# Patient Record
Sex: FEMALE | Race: WHITE | NOT HISPANIC OR LATINO | URBAN - METROPOLITAN AREA
[De-identification: names, ages, dates, MRNs, and addresses within clinical notes are randomized per-mention and may not be internally consistent; named-entity substitution may affect disease eponyms.]

---

## 2022-10-06 ENCOUNTER — HOSPITAL ENCOUNTER (EMERGENCY)
Facility: CLINIC | Age: 32
Discharge: HOME | End: 2022-10-06
Attending: INTERNAL MEDICINE | Admitting: INTERNAL MEDICINE
Payer: COMMERCIAL

## 2022-10-06 ENCOUNTER — OFFICE VISIT (OUTPATIENT)
Dept: BEHAVIORAL HEALTH | Facility: CLINIC | Age: 32
End: 2022-10-06
Payer: COMMERCIAL

## 2022-10-06 VITALS
TEMPERATURE: 97.6 F | DIASTOLIC BLOOD PRESSURE: 90 MMHG | RESPIRATION RATE: 18 BRPM | OXYGEN SATURATION: 100 % | WEIGHT: 139 LBS | HEART RATE: 98 BPM | SYSTOLIC BLOOD PRESSURE: 147 MMHG

## 2022-10-06 VITALS — DIASTOLIC BLOOD PRESSURE: 86 MMHG | HEART RATE: 113 BPM | WEIGHT: 140.8 LBS | SYSTOLIC BLOOD PRESSURE: 135 MMHG

## 2022-10-06 DIAGNOSIS — Z3A.30 PREGNANCY WITH 30 COMPLETED WEEKS GESTATION: ICD-10-CM

## 2022-10-06 DIAGNOSIS — L02.412 CUTANEOUS ABSCESS OF LEFT AXILLA: ICD-10-CM

## 2022-10-06 DIAGNOSIS — F15.21 METHAMPHETAMINE USE DISORDER, SEVERE, IN EARLY REMISSION (H): ICD-10-CM

## 2022-10-06 DIAGNOSIS — Z32.01 PREGNANCY TEST POSITIVE: ICD-10-CM

## 2022-10-06 DIAGNOSIS — F11.20 OPIOID USE DISORDER, SEVERE, DEPENDENCE (H): Primary | ICD-10-CM

## 2022-10-06 DIAGNOSIS — L02.412 ABSCESS OF AXILLA, LEFT: ICD-10-CM

## 2022-10-06 LAB — FENTANYL UR QL: NORMAL

## 2022-10-06 PROCEDURE — 99204 OFFICE O/P NEW MOD 45 MIN: CPT | Performed by: NURSE PRACTITIONER

## 2022-10-06 PROCEDURE — 99284 EMERGENCY DEPT VISIT MOD MDM: CPT | Mod: 25 | Performed by: INTERNAL MEDICINE

## 2022-10-06 PROCEDURE — 10060 I&D ABSCESS SIMPLE/SINGLE: CPT | Performed by: INTERNAL MEDICINE

## 2022-10-06 PROCEDURE — 250N000013 HC RX MED GY IP 250 OP 250 PS 637: Performed by: INTERNAL MEDICINE

## 2022-10-06 PROCEDURE — 76815 OB US LIMITED FETUS(S): CPT | Performed by: INTERNAL MEDICINE

## 2022-10-06 PROCEDURE — 250N000009 HC RX 250: Performed by: INTERNAL MEDICINE

## 2022-10-06 PROCEDURE — 76815 OB US LIMITED FETUS(S): CPT | Mod: 26 | Performed by: INTERNAL MEDICINE

## 2022-10-06 PROCEDURE — 80307 DRUG TEST PRSMV CHEM ANLYZR: CPT | Performed by: NURSE PRACTITIONER

## 2022-10-06 RX ORDER — LIDOCAINE HYDROCHLORIDE AND EPINEPHRINE 10; 10 MG/ML; UG/ML
20 INJECTION, SOLUTION INFILTRATION; PERINEURAL ONCE
Status: DISCONTINUED | OUTPATIENT
Start: 2022-10-06 | End: 2022-10-06 | Stop reason: HOSPADM

## 2022-10-06 RX ORDER — LIDOCAINE/PRILOCAINE 2.5 %-2.5%
1 CREAM (GRAM) TOPICAL ONCE
Status: COMPLETED | OUTPATIENT
Start: 2022-10-06 | End: 2022-10-06

## 2022-10-06 RX ORDER — HYDROXYZINE HYDROCHLORIDE 25 MG/1
25-50 TABLET, FILM COATED ORAL ONCE
Status: COMPLETED | OUTPATIENT
Start: 2022-10-06 | End: 2022-10-06

## 2022-10-06 RX ORDER — BUPRENORPHINE HYDROCHLORIDE, NALOXONE HYDROCHLORIDE 8; 2 MG/1; MG/1
1 FILM, SOLUBLE BUCCAL; SUBLINGUAL 2 TIMES DAILY
Qty: 20 FILM | Refills: 0 | Status: SHIPPED | OUTPATIENT
Start: 2022-10-06 | End: 2022-10-13

## 2022-10-06 RX ORDER — CLINDAMYCIN HCL 300 MG
300 CAPSULE ORAL 4 TIMES DAILY
Qty: 40 CAPSULE | Refills: 0 | Status: SHIPPED | OUTPATIENT
Start: 2022-10-06 | End: 2022-10-16

## 2022-10-06 RX ORDER — BUPRENORPHINE AND NALOXONE 4; 1 MG/1; MG/1
FILM, SOLUBLE BUCCAL; SUBLINGUAL PRN
COMMUNITY
Start: 2022-06-22 | End: 2022-10-06 | Stop reason: DRUGHIGH

## 2022-10-06 RX ADMIN — HYDROXYZINE HYDROCHLORIDE 50 MG: 25 TABLET, FILM COATED ORAL at 18:39

## 2022-10-06 RX ADMIN — LIDOCAINE AND PRILOCAINE 1 G: 25; 25 CREAM TOPICAL at 18:33

## 2022-10-06 ASSESSMENT — PATIENT HEALTH QUESTIONNAIRE - PHQ9: SUM OF ALL RESPONSES TO PHQ QUESTIONS 1-9: 10

## 2022-10-06 ASSESSMENT — ENCOUNTER SYMPTOMS
WOUND: 1
SHORTNESS OF BREATH: 0
ABDOMINAL PAIN: 0
HEADACHES: 0
ARTHRALGIAS: 0
CONFUSION: 0
COLOR CHANGE: 0
EYE REDNESS: 0
FEVER: 0
NECK STIFFNESS: 0
DIFFICULTY URINATING: 0

## 2022-10-06 ASSESSMENT — ACTIVITIES OF DAILY LIVING (ADL): ADLS_ACUITY_SCORE: 35

## 2022-10-06 NOTE — PROGRESS NOTES
[facility] Initial Visit    ASSESSMENT/PLAN                                                      1. Opioid use disorder, severe, dependence (H)  - pt reporting 1 yr hx of illicit opioid use, currently on Suboxone. Early remission. Last full opioid use 2022. Last Suboxone dose ~ 2 weeks ago.   - Recommended resuming consistent daily dosing. Resume buprenorphine 8 mg SL BID. Ok to decrease to once daily if adverse SE noted.   - Recommended at least 6 months - 1 year of buprenorphine maintenance prior to taper  - Continue programming at [facility]   - Fentanyl Urine, Qualitative; Standing  - SUBOXONE 8-2 MG per film; Place 1 Film under the tongue 2 times daily  Dispense: 20 Film; Refill: 0  - naloxone (NARCAN) 4 MG/0.1ML nasal spray; Spray 1 spray (4 mg) into one nostril alternating nostrils as needed for opioid reversal every 2-3 minutes until assistance arrives  Dispense: 0.2 mL; Refill: 11    2. Methamphetamine use disorder, severe, in early remission (H)  - Stable. Early remission. No recent use or cravings.   - Continue programming at [facility]     3. Abscess of axilla, left  - pt was referred to ED for further evaluation and management. Likely will require I& D and antibiotic tx. Hx of MRSA. Last on antibiotic on 22, took partial course of Clindamycin 300 mg q 8 hr x 14 days. Patient is 30 weeks gestation.   - Report given to ED at 4:10 PM    4. Pregnancy with 30 completed weeks gestation  - OBGYN provider is Dr. Blanca Ramos   - reviewed Suboxone is safe in pregnancy and can be continued while breast feeding post partum.   - continue recommended follow up during pregnancy per Dr. Ramos     Return in about 1 week (around 10/13/2022) for Follow up, with me, in person.    SUBJECTIVE                                                      CC/HPI:  Kalee Cedeno is a 31 year old female,  at 30 weeks, with PMH of methamphetamine use disorder, severe, in early remission, high-risk pregnancy  "in second trimester, diet controlled gestational diabetes mellitus (GDM) in third trimester, Rh negative status, and opioid use disorder who presents to the [facility] for initial visit.       Brief History:  Kalee Cedeno was first seen in Recovery Clinic on 10/06/22. She was referred by [address]  Patient's reasons for seeking treatment on this date include seeking a Suboxone prescription because she is out and currently pregnant.     She started IOP with housing at [facility] today. Previously living at her ex boyfriends house in [place], [place]. Reports she could not find Suboxone rx this morning, her ex boyfriend is using heroin and she suspected he took the prescription. Reports she is currently taking 2-3 films (8-2 mg films) about three times per week, taking suboxone \"as needed\" for withdrawal symptoms or cravings. Last dose of Suboxone was almost 2 weeks ago. Currently feeling anxious, hot/cold flashes, \"I think my blood pressure gets higher\", skin crawling. Last meth and opioid use was 2022. Denies meth cravings. Denies IVDU.     Periods of sustained sobriety during and after pregnancies.  in 2016, had her son in 2017, her now ex  was an alcoholic and they  on their second wedding anniversary. Later her brother passed away and she started methamphetamine daily. Heroin use started later, has been using for about 1 year. Started on Suboxone at the beginning of current pregnancy, found out in 2022.     Reports skin lesions under her left axilla and right axilla. Left is more bothersome. Reports purulent drainage of left lesion this morning. Has had previous incisions and drainage. Last on ABX 22, prescribed clindamycin however only took partial course after lesions cleared. Was told by Dr. Ramos to hold onto the prescription and take if lesion reappear.   She does not have current Rx, [facility] treatment would not allow her to have  prescription. No nausea, " vomiting, fever, chills, no pain. Lesions are tender. No HA. No cough, SOB, CP, heart palpitations.     Abscess of left axilla 04/15/2022 Ab: MILA Mcconnell (Synbiota)       Substance Use History :  Opioids:   Age at first use: 31 yo  Current use: timing of last use: 7/2022; substance: meth, heroin; quantity per pt not that much; route: smoke     IV drug use: No   History of overdose: No  Previous residential or outpatient treatments for addiction : Yes: 2  Previous medication treatments for addiction: Yes: suboxone  Longest period of sobriety: 1 year  Medical complications related to substance use: denies  Hepatitis C: neg; Antibody non reactive on 2/7/2022  HIV: neg; HIV 1/2 Ab/Ag non reactive on 2/7/2022    Taking buprenorphine? No, only taking as needed durng pregnancy As prescribed? No  Number of buprenorphine films/tablets remaining currently: 0    Narcan currently available: No    DSM-5 OUD criteria met:  Taken in larger amounts/greater time spent in behavior over longer period of time than intended,Yes: met for opioids and methamphetamine    Persistent desire or unsuccessful efforts to cut down or control use/behavior, Yes: met for opioids and methamphetamine    A great deal of time is spent in activities necessary to obtain the substance/participate in the behavior or recover from its effects, Yes: met for opioids and methamphetamine    Cravings,Yes: met for opioids  Recurrent use/behavior resulting in failure to fulfill major role obligations at work, school, or home,Yes: met for opioids and methamphetamine    Continued use/behavior despite having persistent or recurrent social or interpersonal problems caused or exacerbated by effects of use/behavior,Yes: met for opioids and methamphetamine    Important social, occupational, or recreational activities are given up or reduced because of use/behavior, Yes: met for opioids and methamphetamine    Recurrent use/behavior in situations in which it is physically  hazardous,Yes: met for opioids and methamphetamine    Continued use/behavior despite knowledge of having a persistent or recurrent physical or psychological problem that is likely to have been caused or exacerbated by use/behavior,Yes: met for opioids and methamphetamine       Other Addiction History:  Stimulants (cocaine, methamphetamine, MDMA, etc)   Past use: yes  Use in last 6 months: yes, was using methamphetamine daily.   Sedatives/hypnotics/anxiolytics: (benzodiazepines -Xanax, Valium, Ativan, Klonopin- GHB, Ambien, phenobarbital, etc)  Past use: denies  Use in last 6 months: no  Alcohol:   Past use: yes  Use in last 6 months: no  Nicotine:   Cigarettes: yes  Vaping: yes, not recently  Chewing tobacco: no  Cannabis:   Past use: yes  Use in last 6 months: yes  Hallucinogens: (LSD/acid, mushrooms, ketamine, etc)  Past use: tried mushrooms  Use in last 6 months: no  Behavioral addictions (eating disorder, gambling, pornography, opal:)   denies     [place] Prescription Drug Monitoring Program Reviewed:  Yes  09/01/2022  2   09/01/2022  Suboxone 8 Mg-2 MG SL Film    42.00  14     Past Medical Hx (retrieved from Epic chart review)     Methamphetamine use disorder, severe, in early remission (HCC)     Stressful life event affecting family     High-risk pregnancy in second trimester     Substance abuse (HCC)     Diet controlled gestational diabetes mellitus (GDM) in third trimester     Rh negative status during pregnancy in first trimester, antepartum     Family history of hepatitis C     PAST PSYCHIATRIC HISTORY:  Diagnoses- manic depression, anxiety, adult ADHD (pt reported)   Suicide Attempts: No   Hospitalizations: No     PHQ 10/6/2022   PHQ-9 Total Score 10   Q9: Thoughts of better off dead/self-harm past 2 weeks Not at all     If PHQ-9 score of 15 or higher, has [facility] therapist or provider been notified? No    Any current suicidal ideation? No  If yes, has [facility] therapist or provider  been notified? N/A    Mental health provider: denies (follow up on MH referral if needed)    No past surgical history on file.    Medications:  No current outpatient medications on file prior to visit.  No current facility-administered medications on file prior to visit.    ALLERGIES  Allergen Reactions     Benzodiazepines Shortness of Breath / Difficulty Breathing     No family history on file.      Social History  Housing status: [facility]  Employment status: Unemployed, not seeking work  Relationship status: Single  Children: 2 children, 10 and 5yr old - not currently under her care. Both children are living with their fathers. Will regain visitation after completing 30 treatment program. No CPS involvement. [place] agreement between her and the fathers.   Legal: denies  Insurance needs: active  Contact information up to date? yes    3rd Party Involvement not today (please obtain SIMRAN if pt would like to include)    REVIEW OF SYSTEMS:  General: No recent fevers.   Eyes:  No vision concerns.  No jaundice.    Resp: No coughing, wheezing or shortness of breath  CV: No chest pains or palpitations  GI: No complaints  : No urinary frequency or dysuria, no discharge  Musculoskeletal: No significant muscle or joint pains other than as above.  No edema  Neurologic: No numbness, tingling, weakness, problems with balance or coordination  Psychiatric: No acute concerns.   Skin: Positive for skin lesion/abscess - see HPI     OBJECTIVE                                                      /86   Pulse 113   Wt 63.9 kg (140 lb 12.8 oz)     Physical Exam  Constitutional:       General: She is not in acute distress.     Appearance: Normal appearance. She is not ill-appearing or diaphoretic.   Eyes:      General: No scleral icterus.     Extraocular Movements: Extraocular movements intact.      Conjunctiva/sclera: Conjunctivae normal.      Pupils: Pupils are equal, round, and reactive to light.   Pulmonary:      Effort:  Pulmonary effort is normal.   Skin:     General: Skin is warm and dry.      Findings: Abscess present.          Neurological:      General: No focal deficit present.      Mental Status: She is alert and oriented to person, place, and time.   Psychiatric:         Attention and Perception: Attention and perception normal.         Mood and Affect: Mood and affect normal. Mood is not anxious or depressed. Affect is not flat.         Speech: Speech normal. Speech is not rapid and pressured or slurred.         Behavior: Behavior is cooperative.         Thought Content: Thought content normal. Thought content does not include suicidal ideation. Thought content does not include suicidal plan.         Cognition and Memory: Cognition and memory normal.         Judgment: Judgment normal.      Comments: Mood and affect are congruent with subject matter        Labs:    UDS: Urine Drug Screen Results  AMP: Negative  BAR: Negative  BUP: Negative  BZO: Negative  FRANCI: Negative  mAMP: Negative  MDMA : Negative  MTD: Negative  [id number]: Negative  OXY: Negative  PCP : Negative  THC : Negative  *POC urine drug screen does not screen for Fentanyl    No results found for this or any previous visit (from the past 720 hour(s)).    Patient counseling completed today:  Discussed mechanism of action, potential risks/benefits/side effects of medications and other recommendations above.    Discussed risk of precipitated withdrawal with initiation of buprenorphine in the presence of full opioid agonists.    Harm reduction counseling including never use alone, availability of naloxone, avoiding combination of opioids with benzodiazepines, alcohol, or other sedatives, safer administration.      Discussed importance of building a network of supportive relationships and psychosocial interventions to treat use disorders; including motivational interviewing regarding psychosocial treatment for addiction.     I sent a total of 50 minutes today on the care  of this patient. This consisted of face-to-face time as well as time spent on pre-visit and post-visit activities including coordination of care, chart review, results review, and documentation.     KECIA Monsalve CNP on 10/6/2022 at 3:31 PM  [facility]  [address], [address]  [place] [address]  [phone]

## 2022-10-06 NOTE — ED TRIAGE NOTES
Pt have wound on both axilla. Pt was diagnosed with MRSA last April. Pt 30wks pregnant      Triage Assessment     Row Name 10/06/22 1708       Triage Assessment (Adult)    Airway WDL WDL       Respiratory WDL    Respiratory WDL WDL    Mucous Membranes cracked       Skin Circulation/Temperature WDL    Skin Circulation/Temperature WDL WDL       Cardiac WDL    Cardiac WDL WDL       Peripheral/Neurovascular WDL    Peripheral Neurovascular WDL WDL       Cognitive/Neuro/Behavioral WDL    Cognitive/Neuro/Behavioral WDL WDL

## 2022-10-06 NOTE — ED PROVIDER NOTES
ED Provider Note  [facility]      History     Chief Complaint   Patient presents with     Wound Check     The history is provided by the patient and medical records.     Kalee Cedeno is a 31 year old  female at 30w3d with history of polysubstance abuse maintained on Suboxone and prior MRSA infection presenting to the ED for evaluation of skin sores and concern for recurrent MRSA infection. Patient reports developing worsening sores over the bilateral axilla over the past week. These are extremely painful. Left side is worse than right, some of the sores on the right drained on their own. This is similar to her prior MRSA infection that was in the same area which required I&D. She was on Clindamycin at that time and was given another prescription at that time to use in the case of recurrent infection, but is currently in treatment and they would not allow her to take this due to the outdated label. She denies IVDU. She was feeling withdrawal symptoms as well today due to not having her Suboxone, but was able to take it just prior to arrival and is feeling better in that regard.     Past Medical History  No past medical history on file.  No past surgical history on file.  clindamycin (CLEOCIN) 300 MG capsule  SUBOXONE 8-2 MG per film  naloxone (NARCAN) 4 MG/0.1ML nasal spray      No Known Allergies  Family History  No family history on file.  Social History   Social History     Substance Use Topics     Alcohol use: Never     Drug use: Not Currently     Types: Methamphetamines     Comment: on treatment      Past medical history, past surgical history, medications, allergies, family history, and social history were reviewed with the patient. No additional pertinent items.       Review of Systems   Constitutional: Negative for fever.   HENT: Negative for congestion.    Eyes: Negative for redness.   Respiratory: Negative for shortness of breath.    Cardiovascular: Negative for chest pain.    Gastrointestinal: Negative for abdominal pain.   Genitourinary: Negative for difficulty urinating.   Musculoskeletal: Negative for arthralgias and neck stiffness.   Skin: Positive for wound (MRSA sores over bilateral axilla). Negative for color change.   Neurological: Negative for headaches.   Psychiatric/Behavioral: Negative for confusion.     A complete review of systems was performed with pertinent positives and negatives noted in the HPI, and all other systems negative.    Physical Exam   BP: (!) 147/90  Pulse: 98  Temp: 97.6  F (36.4  C)  Resp: 18  Weight: 63 kg (139 lb)  SpO2: 100 %  Physical Exam  Vitals and nursing note reviewed.   Constitutional:       General: She is not in acute distress.     Appearance: She is not diaphoretic.   HENT:      Head: Atraumatic.      Mouth/Throat:      Pharynx: No oropharyngeal exudate.   Eyes:      General: No scleral icterus.     Pupils: Pupils are equal, round, and reactive to light.   Cardiovascular:      Rate and Rhythm: Normal rate and regular rhythm.      Heart sounds: Normal heart sounds. No murmur heard.    No friction rub. No gallop.   Pulmonary:      Effort: Pulmonary effort is normal. No respiratory distress.      Breath sounds: Normal breath sounds. No stridor. No wheezing, rhonchi or rales.   Chest:      Chest wall: No tenderness.   Abdominal:      General: Abdomen is flat. Bowel sounds are normal. There is no distension.      Palpations: Abdomen is soft. There is no mass.      Tenderness: There is no abdominal tenderness. There is no right CVA tenderness, left CVA tenderness, guarding or rebound.      Hernia: No hernia is present.   Musculoskeletal:         General: No tenderness.      Cervical back: Neck supple.   Skin:     General: Skin is warm.      Findings: Abscess, erythema and wound present. No abrasion, acne, bruising, burn, ecchymosis, signs of injury, laceration, lesion, petechiae or rash.          Neurological:      General: No focal deficit  present.         ED Course      [facility]    PROCEDURE: -Incision/Drainage    Date/Time: 10/6/2022 10:50 PM  Performed by: Jackelyn Andrade MD  Authorized by: Jackelyn Andrade MD     Risks, benefits and alternatives discussed.      LOCATION:      Type:  Abscess    Size:  Png pong ball size    Location:  Upper extremity    Upper extremity location:  Arm    Arm location:  L upper arm    PRE-PROCEDURE DETAILS:     Skin preparation:  Betadine    PROCEDURE TYPE:     Complexity:  Simple    ANESTHESIA (see MAR for exact dosages):     Anesthesia method:  Local infiltration    Local anesthetic:  Lidocaine 1% WITH epi    PROCEDURE DETAILS:     Needle aspiration: no      Incision types:  Single straight    Incision depth:  Dermal    Scalpel blade:  11    Wound management:  Probed and deloculated    Drainage:  Purulent    Drainage amount:  Moderate    Wound treatment:  Wound left open    Packing materials:  1/4 in iodoform gauze    PROCEDURE    Patient Tolerance:  Patient tolerated the procedure well with no immediate complications    Results for orders placed during the hospital encounter of 10/06/22    POC US OB TRANSABDOMINAL LIMITED    Impression  Limited Bedside Transabdominal ultrasound for evaluation of IUP  Performed any interpreted by me.    Indication:  Left axilla abscess  Findings:  The lower abdomen was interrogated with a curvilinear probe. The uterus was identified.  Within the uterus there is a moving fetus with visible heart rate with FHR of 144    Impression: Intrauterine pregnancy              Results for orders placed or performed during the hospital encounter of 10/06/22   POC US OB TRANSABDOMINAL LIMITED     Status: None    Impression    Limited Bedside Transabdominal ultrasound for evaluation of IUP        Performed any interpreted by me.    Indication:  Left axilla abscess  Findings:  The lower abdomen was interrogated with a curvilinear probe. The uterus was  identified.   Within the uterus there is a moving fetus with visible heart rate with FHR of 144    Impression: Intrauterine pregnancy   Results for orders placed or performed in visit on 10/06/22   Fentanyl Urine, Qualitative     Status: Normal   Result Value Ref Range    Fentanyl Qual Urine Screen Negative Screen Negative     Medications   lidocaine-prilocaine (EMLA) cream 1 g (1 g Topical Given 10/6/22 1833)   hydrOXYzine (ATARAX) tablet 25-50 mg (50 mg Oral Given 10/6/22 1839)        Assessments & Plan (with Medical Decision Making)  Left recurrent abscess of axilla, s/p I and D as above after local with lido and epi, large pus out, pt tolerated well, POCUS with IUP and good FHT, will discharge with clinda and follow up with her PMD OB.       I have reviewed the nursing notes. I have reviewed the findings, diagnosis, plan and need for follow up with the patient.    Discharge Medication List as of 10/6/2022  8:07 PM      START taking these medications    Details   clindamycin (CLEOCIN) 300 MG capsule Take 1 capsule (300 mg) by mouth 4 times daily for 10 days, Disp-40 capsule, R-0, Local Print             Final diagnoses:   Cutaneous abscess of left axilla   Pregnancy test positive   I, Emy Taylor, am serving as a trained medical scribe to document services personally performed by Jackelyn Andrade Md, based on the provider's statements to me.     Jackelyn GODDARD Md, was physically present and have reviewed and verified the accuracy of this note documented by Emy Taylor.      --  Jackelyn Andrade Md  [facility] EMERGENCY DEPARTMENT  10/6/2022     Jackelyn Andrade MD  10/06/22 [id number]

## 2022-10-07 ENCOUNTER — TELEPHONE (OUTPATIENT)
Dept: BEHAVIORAL HEALTH | Facility: CLINIC | Age: 32
End: 2022-10-07

## 2022-10-07 ENCOUNTER — TRANSCRIBE ORDERS (OUTPATIENT)
Dept: BEHAVIORAL HEALTH | Facility: CLINIC | Age: 32
End: 2022-10-07

## 2022-10-13 ENCOUNTER — OFFICE VISIT (OUTPATIENT)
Dept: BEHAVIORAL HEALTH | Facility: CLINIC | Age: 32
End: 2022-10-13
Payer: COMMERCIAL

## 2022-10-13 VITALS — SYSTOLIC BLOOD PRESSURE: 134 MMHG | DIASTOLIC BLOOD PRESSURE: 80 MMHG | HEART RATE: 86 BPM

## 2022-10-13 DIAGNOSIS — F11.20 OPIOID USE DISORDER, SEVERE, DEPENDENCE (H): Primary | ICD-10-CM

## 2022-10-13 DIAGNOSIS — F15.21 METHAMPHETAMINE USE DISORDER, SEVERE, IN EARLY REMISSION (H): ICD-10-CM

## 2022-10-13 DIAGNOSIS — Z3A.31 PREGNANCY WITH 31 COMPLETED WEEKS GESTATION: ICD-10-CM

## 2022-10-13 PROCEDURE — 99214 OFFICE O/P EST MOD 30 MIN: CPT | Performed by: NURSE PRACTITIONER

## 2022-10-13 RX ORDER — BUPRENORPHINE HYDROCHLORIDE, NALOXONE HYDROCHLORIDE 8; 2 MG/1; MG/1
1 FILM, SOLUBLE BUCCAL; SUBLINGUAL 2 TIMES DAILY
Qty: 30 FILM | Refills: 0 | Status: SHIPPED | OUTPATIENT
Start: 2022-10-13 | End: 2022-11-04

## 2022-10-13 ASSESSMENT — PATIENT HEALTH QUESTIONNAIRE - PHQ9: SUM OF ALL RESPONSES TO PHQ QUESTIONS 1-9: 6

## 2022-10-13 NOTE — PROGRESS NOTES
"[facility] - Recovery Clinic Follow Up    ASSESSMENT/PLAN                                                      1. Opioid use disorder, severe, dependence (H)  Controlled.   Continue buprenorphine 16 mg per day, take 8 mg SL BID. Cravings controlled. No recent use. No adverse SE.   Continue inpatient programming at [facility]. Encouraged outpatient program near [place] after graduation and continuation of buprenorphine maintenance.   - Confirms access to narcan.   - SUBOXONE 8-2 MG per film; Place 1 Film under the tongue 2 times daily  Dispense: 30 Film; Refill: 0    2. Methamphetamine use disorder, severe, in early remission (H)  - Controlled. No recent use or cravings. Interventions as above for current and ongoing recovery supports.     3. Pregnancy with 31 completed weeks gestation  - Following with Dr. Rachel JACOBSEN.   - Ultrasound on 10/27/22 per patient      Return in about 2 weeks (around 10/27/2022) for Follow up, with me, in person.    SUBJECTIVE                                                        CC/HPI:  Kalee Cedeno is a 31 year old female,  at 31 weeks, with PMH of methamphetamine use disorder, severe, in early remission, high-risk pregnancy in third trimester, hx of diet controlled gestational diabetes mellitus (GDM) in third trimester, Rh negative status, and opioid use disorder who presents to the Recovery Clinic for follow up.         Brief History:  Kalee Cedeno was first seen in Recovery Clinic on 10/06/22. She was referred by [address]  Patient's reasons for seeking treatment on this date include seeking a Suboxone prescription because she is out and currently pregnant. Previously living at her ex boyfriends house in [place], [place]. Taking 2-3 films (8-2 mg films) about three times per week, \"as needed\" for withdrawal symptoms or cravings. Last dose of Suboxone was almost 2 weeks ago. Last meth and opioid use was 2022. Denies meth cravings. Denies IVDU. Periods of sustained " sobriety during and after pregnancies.  in 2016, had her son in 2017, her now ex  was an alcoholic and they  on their second wedding anniversary. Later her brother passed away and she started methamphetamine daily. Heroin use started later, has been using for about 1 year. Started on Suboxone at the beginning of current pregnancy, found out in April of 2022. Started consistent daily dosing, 16 mg TDD.       Substance Use History :  Opioids:   Age at first use: 29 yo  Current use: timing of last use: 7/2022; substance: meth, heroin; quantity per pt not that much; route: smoke                IV drug use: No   History of overdose: No  Previous residential or outpatient treatments for addiction : Yes: 2  Previous medication treatments for addiction: Yes: suboxone  Longest period of sobriety: 1 year  Medical complications related to substance use: denies  Hepatitis C: neg; Antibody non reactive on 2/7/2022  HIV: neg; HIV 1/2 Ab/Ag non reactive on 2/7/2022      Other Addiction History:  Stimulants (cocaine, methamphetamine, MDMA, etc)   Past use: yes  Use in last 6 months: yes, was using methamphetamine daily.   Sedatives/hypnotics/anxiolytics: (benzodiazepines -Xanax, Valium, Ativan, Klonopin- GHB, Ambien, phenobarbital, etc)  Past use: denies  Use in last 6 months: no  Alcohol:   Past use: yes  Use in last 6 months: no  Nicotine:   Cigarettes: yes  Vaping: yes, not recently  Chewing tobacco: no  Cannabis:   Past use: yes  Use in last 6 months: yes  Hallucinogens: (LSD/acid, mushrooms, ketamine, etc)  Past use: tried mushrooms  Use in last 6 months: no  Behavioral addictions (eating disorder, gambling, pornography, opal:)   denies        Most recent Recovery Clinic visit 10/6/22  A/P from that visit:     1. Opioid use disorder, severe, dependence (H)  - pt reporting 1 yr hx of illicit opioid use, currently on Suboxone. Early remission. Last full opioid use 7/2022. Last Suboxone dose ~ 2 weeks  "ago.   - Recommended resuming consistent daily dosing. Resume buprenorphine 8 mg SL BID. Ok to decrease to once daily if adverse SE noted.   - Recommended at least 6 months - 1 year of buprenorphine maintenance prior to taper  - Continue programming at [facility]   - Fentanyl Urine, Qualitative; Standing  - SUBOXONE 8-2 MG per film; Place 1 Film under the tongue 2 times daily  Dispense: 20 Film; Refill: 0  - naloxone (NARCAN) 4 MG/0.1ML nasal spray; Spray 1 spray (4 mg) into one nostril alternating nostrils as needed for opioid reversal every 2-3 minutes until assistance arrives  Dispense: 0.2 mL; Refill: 11     2. Methamphetamine use disorder, severe, in early remission (H)  - Stable. Early remission. No recent use or cravings.   - Continue programming at [facility]      3. Abscess of axilla, left  - pt was referred to ED for further evaluation and management. Likely will require I& D and antibiotic tx. Hx of MRSA. Last on antibiotic on 8/25/22, took partial course of Clindamycin 300 mg q 8 hr x 14 days. Patient is 30 weeks gestation.   - Report given to ED at 4:10 PM     4. Pregnancy with 30 completed weeks gestation  - OBGYN provider is Dr. Blanca Ramos   - reviewed Suboxone is safe in pregnancy and can be continued while breast feeding post partum.   - continue recommended follow up during pregnancy per Dr. Ramos      Return in about 1 week (around 10/13/2022) for Follow up, with me, in person.    Today, pt states:   - Reports things are going really well at [facility]. \" I really like the staff there\" She feels she has adequate freedom and accountability.   - She has been taking consistent daily dosing, taking 8 mg SL BID. This has improved her intermittent withdrawal symptoms significantly. Denies adverse SE, no constipation, dry mouth, leg swelling, diaphoresis. Cravings are controlled. No recent use.   - No methamphetamine cravings or recent use.   - She got to see her mom and sister last week when they dropped off " "some clothes for her.   - She has an ultrasound appointment on 10/27/22. She also has a telephone follow up visit with Dr. Ramos.   - Has been sleeping well   - She is working on nutrition, and dietary fiber. Drinking plenty of water during the day.   - Mood has been improved, anxiety has improved since her last visit. She is getting to know some of her \"house mates\" Having a birthday party for one of the girls this weekend.   - Left axilla abscess symptoms improved. Did have I&D now on ABX. 10 day course. Warm compressed to express drainage as needed. Less sore today. Plans to follow up after ABX if needed.   - Plans to move back to [place] after treatment at [facility] and complete an outpatient program.   - She is able to talk to her children every night on the phone.   - Without other concerns today     [place] Prescription Drug Monitoring Program Reviewed:  Yes  10/06/2022  4   10/06/2022  Suboxone 8 Mg-2 MG SL Film  20.00  10       Medications:  clindamycin (CLEOCIN) 300 MG capsule, Take 1 capsule (300 mg) by mouth 4 times daily for 10 days  naloxone (NARCAN) 4 MG/0.1ML nasal spray, Spray 1 spray (4 mg) into one nostril alternating nostrils as needed for opioid reversal every 2-3 minutes until assistance arrives    No current facility-administered medications on file prior to visit.      No Known Allergies    PMH, PSH, FamHx reviewed    Past Medical Hx (retrieved from Epic chart review)     Methamphetamine use disorder, severe, in early remission (HCC)     Stressful life event affecting family     High-risk pregnancy in second trimester     Substance abuse (HCC)     Diet controlled gestational diabetes mellitus (GDM) in third trimester     Rh negative status during pregnancy in first trimester, antepartum     Family history of hepatitis C      PAST PSYCHIATRIC HISTORY:  Diagnoses- manic depression, anxiety, adult ADHD (pt reported)   Suicide Attempts: No   Hospitalizations: No     Social History  Housing " status: [facility]  Employment status: Unemployed, not seeking work  Relationship status: Single  Children: 2 children, 10 and 5yr old - not currently under her care. Both children are living with their fathers. Will regain visitation after completing 30 treatment program. No CPS involvement. [place] agreement between her and the fathers.   Legal: denies  Insurance needs: active  Contact information up to date? yes    OBJECTIVE                                                      /80   Pulse 86     Physical Exam  Constitutional:       General: She is not in acute distress.     Appearance: Normal appearance.   Eyes:      Extraocular Movements: Extraocular movements intact.   Cardiovascular:      Rate and Rhythm: Normal rate.   Pulmonary:      Effort: Pulmonary effort is normal.   Neurological:      Mental Status: She is alert and oriented to person, place, and time.   Psychiatric:         Attention and Perception: Attention and perception normal.         Mood and Affect: Mood and affect normal. Mood is not anxious or depressed. Affect is not flat.         Speech: Speech normal.         Behavior: Behavior is cooperative.         Thought Content: Thought content does not include suicidal ideation. Thought content does not include suicidal plan.         Cognition and Memory: Cognition and memory normal.         Judgment: Judgment normal.         Labs:    UDS: positive for buprenorphine  *POC urine drug screen does not screen for Fentanyl    Recent Results (from the past 240 hour(s))   Fentanyl Urine, Qualitative    Collection Time: 10/06/22  4:20 PM   Result Value Ref Range    Fentanyl Qual Urine Screen Negative Screen Negative       Patient counseling completed today:  Discussed mechanism of action, potential risks/benefits/side effects of medications and other recommendations above.  Recommend pt keep naloxone in their possession and reviewed other aspects of harm reduction to reduce risk of overdose with  return to use.   Recommended avoiding concurrent use of alcohol, benzodiazepines or other sedatives with buprenorphine or other opioids.  Discussed importance of building a network of supportive relationships, avoiding people/places/things associated with past use to reduce risk of relapse; including motivational interviewing regarding psychosocial treatment for addiction.     KECIA Monsalve CNP  [facility]  [address] [place] [address] [id number] [phone]

## 2022-10-13 NOTE — PROGRESS NOTES
[facility]    Peer  met with Kalee Cedeno in the Recovery Clinic to introduce himself, detail services provided and discuss current status of recovery. Pt appeared alert, oriented and open to feedback during our discussion.     Pt arrives for visit with lew for suboxone refill.   Pt reports being from [place] and chose to leave that environment for [facility] - is currently at [facility] facility.  Pt reports program is beneficial to her recovery.  Pt reports roommates are positive and supportive. PRC and pt discussed the freedom residents are allowed In the program as being a positive for hr as she has not thrived in programs with constant  programming.  PRC and pt discussed establishing firm and healthy boundaries with ppl, places and things in a program which allows more freedom to leave the facility.     Pt reports being 31 weeks pregnant and is aware of CPS implications in relationship to active substance use.  Pt reports two other children are currently with her boyfriend. Pt goal is to find long term sobriety and maintain a family with her children.     PRC provided business card to pt welcoming contact for recovery based support and resources. PRC and pt agree to speak again during an upcoming  visit.           Service Type:     Individual     Session Start Time:       9:00 am                  Session End Time:         9:15 am    Session Length:         15 minutes    Patient Goal:   To utilize suboxone assisted treatment for sobriety and long term recovery.   Continue [facility] treatment programming.    Goal Progress:   Ongoing.    Key Risk Factors to Recovery:   PRC encourages being aware of risk factors that can lead to re-use which include avoiding isolation, avoiding triggers and managing cravings in a healthy manner. being open and willing to acceptance and change on a daily basis.  PRC encourages pt to establish a sober network  calling tree to reach out to when needed.  Continue to practice honesty with ourselves and trusted support person(s).   PRC encourages regular attendance at recovery based meetings as well as finding a sponsor for mentoring and accountability.   PRC encourages consideration of other services such as counseling for mental health issues which can correlate with our substance use.      Support Needs:   Ongoing care, support and resources for opioid substance use disorder.     Follow up:   PRC has provided pt with his contact information for support and resource needs.    PRC and pt agree to meet during an upcoming  visit.       [facility] [id number], [address]   [place], [place], [address]  Clinic Phone: [phone]  Clinic Fax: [phone]  Peer  phone: [phone]    Open Monday - Friday  9:00am-4:00pm  Walk in hours: 9am-3pm      Willie Hatfield  October 13, 2022  10:28 AM    Susan Thomas MA, [facility] provides clinical  oversite and supervision of care.

## 2022-10-13 NOTE — NURSING NOTE
[facility]      Rooming information:  Approximate last use of illicit opioids: 7/2022  Taking buprenorphine? Yes:  As prescribed? Yes:   Number of buprenorphine films/tablets remaining currently: 8  Side effects related to buprenorphine (constipation, dry mouth, sedation?) Yes: little contsipation   Narcan currently available: Yes  Other recent substance use:    Denies  NICOTINE-Yes: cigarettes  If using nicotine, ready to quit? No    Point of care urine drug screen positive for:  Urine Drug Screen Results  AMP: Negative  BAR: Negative  BUP: Positive  BZO: Negative  FRANCI: Negative  mAMP: Negative  MDMA : Negative  MTD: Negative  [id number]: Negative  OXY: Negative  PCP : Negative  THC : Negative  *POC urine drug screen does not screen for Fentanyl    Pregnancy Status   LMP: pregnant      PHQ Assesment Total Score(s) 10/13/2022   PHQ-9 Score 6   Some recent data might be hidden       If PHQ-9 score of 15 or higher, has Recovery Clinic therapist or provider been notified? No    Any current suicidal ideation? No  If yes, has Recovery Clinic therapist or provider been notified? N/A    Primary care provider: Physician No Ref-Primary     Mental health provider: denies (follow up on MH referral if needed)    Insurance needs: active    Housing needs: stable    Contact information up to date? yes    3rd Party Involvement not today (please obtain SIMRAN if pt would like to include)    Rut Alvarado MA  October 13, 2022  9:22 AM

## 2022-11-04 ENCOUNTER — OFFICE VISIT (OUTPATIENT)
Dept: BEHAVIORAL HEALTH | Facility: CLINIC | Age: 32
End: 2022-11-04
Payer: COMMERCIAL

## 2022-11-04 VITALS — SYSTOLIC BLOOD PRESSURE: 115 MMHG | DIASTOLIC BLOOD PRESSURE: 79 MMHG | HEART RATE: 78 BPM

## 2022-11-04 DIAGNOSIS — F11.20 OPIOID USE DISORDER, SEVERE, DEPENDENCE (H): Primary | ICD-10-CM

## 2022-11-04 DIAGNOSIS — F11.90 CHRONIC, CONTINUOUS USE OF OPIOIDS: Primary | ICD-10-CM

## 2022-11-04 DIAGNOSIS — F15.21 METHAMPHETAMINE USE DISORDER, SEVERE, IN EARLY REMISSION (H): ICD-10-CM

## 2022-11-04 DIAGNOSIS — Z3A.34 34 WEEKS GESTATION OF PREGNANCY: ICD-10-CM

## 2022-11-04 PROCEDURE — 99215 OFFICE O/P EST HI 40 MIN: CPT | Performed by: FAMILY MEDICINE

## 2022-11-04 PROCEDURE — 99207 PR NO CHARGE LOS: CPT

## 2022-11-04 RX ORDER — BUPRENORPHINE HYDROCHLORIDE, NALOXONE HYDROCHLORIDE 8; 2 MG/1; MG/1
1 FILM, SOLUBLE BUCCAL; SUBLINGUAL 2 TIMES DAILY
Qty: 26 FILM | Refills: 0 | Status: SHIPPED | OUTPATIENT
Start: 2022-11-04 | End: 2023-02-24

## 2022-11-04 ASSESSMENT — PATIENT HEALTH QUESTIONNAIRE - PHQ9: SUM OF ALL RESPONSES TO PHQ QUESTIONS 1-9: 3

## 2022-11-04 NOTE — PROGRESS NOTES
[facility]    Peer  met with Kalee Cedeno in the Recovery Clinic to introduce himself, detail services provided and discuss current status of recovery. Pt appeared alert, oriented and open to feedback during our discussion.       Kalee explained needs  Suboxone prescription because she is out. She shared that she is currently pregnant. She is due Dec. PRC gave encouraging words and praised her on her journey.          [facility] provided business card to pt welcoming contact for recovery based support and resources. PRC and pt agree to speak again during an upcoming  visit.           Service Type:     Individual     Session Start Time:                       Session End Time:        Session Length:             Patient Goal: To utilize suboxone assisted treatment for sobriety and long term recovery.     Goal Progress:   Ongoing.    Key Risk Factors to Recovery:   PRC encourages being aware of risk factors that can lead to re-use which include avoiding isolation, avoiding triggers and managing cravings in a healthy manner. being open and willing to acceptance and change on a daily basis.  PRC encourages pt to establish a sober network calling tree to reach out to when needed.  Continue to practice honesty with ourselves and trusted support person(s).   PRC encourages regular attendance at recovery based meetings as well as finding a sponsor for mentoring and accountability.   PRC encourages consideration of other services such as counseling for mental health issues which can correlate with our substance use.      Support Needs:   Ongoing care, support and resources for opioid substance use disorder.     Follow up:   [facility] has provided pt with his contact information for support and resource needs.    PRC and pt agree to meet during an upcoming  visit.       [facility]  [address], [address]   [place], [place], [address]  Clinic Phone: [phone]  Clinic Fax:  [phone]  Peer  phone: [phone]    Open Monday - Friday  9:00am-4:00pm  Walk in hours: 9am-3pm      Cookie Price  November 4, 2022  3:45 PM    Susan Thomas MA, [facility] provides clinical  oversite and supervision of care.

## 2022-11-04 NOTE — NURSING NOTE
[facility]      Rooming information:  Approximate last use of full opioid agonist: 7/2022  Taking buprenorphine? Yes:  As prescribed? Yes:   Number of buprenorphine films/tablets remaining currently: 2   Side effects related to buprenorphine (constipation, dry mouth, sedation?) No   Narcan currently available: Yes  Other recent substance use:    Denies  NICOTINE-Yes: cigarettes  If using nicotine, ready to quit? No    Point of care urine drug screen positive for:  Urine Drug Screen Results  AMP: Negative  BAR: Negative  BUP: Positive  BZO: Negative  FRANCI: Negative  mAMP: Negative  MDMA : Negative  MTD: Negative  DUD459: Negative  OXY: Negative  PCP : Negative  THC : Negative  *POC urine drug screen does not screen for Fentanyl    Pregnancy Status   LMP: Pregnant        PHQ Assesment Total Score(s) 11/4/2022   PHQ-9 Score 3   Some recent data might be hidden       If PHQ-9 score of 15 or higher, has Recovery Clinic therapist or provider been notified? No    Any current suicidal ideation? No  If yes, has Recovery Clinic therapist or provider been notified? N/A    Primary care provider: Physician No Ref-Primary     Mental health provider: denies (follow up on MH referral if needed)    Insurance needs: active     Housing needs: treatment    Contact information up to date? yes    3rd Party Involvement not today (please obtain SIMRAN if pt would like to include)    Rut Alvarado MA  November 4, 2022  1:35 PM

## 2022-11-04 NOTE — PROGRESS NOTES
[facility] Follow Up    ASSESSMENT/PLAN                                                      1. Opioid use disorder, severe, dependence (H)  Stable.  Continue Suboxone 8-2mg BID.  Continue treatment at [facility].  Planning to resume care in [facility] after graduation.  I spoke with Dr Desirae Ramos @ [facility] and she knows patient and will be able to resume care for her upon discharge ([facility] [address], phone # [phone]).  Discussed plan to provide 30 days of medications at next visit to allow her time to re-establish with Dr Ramos.  There are outpatient treatment options with [facility] as well.    - SUBOXONE 8-2 MG per film; Place 1 Film under the tongue 2 times daily  Dispense: 26 Film; Refill: 0    2. Methamphetamine use disorder, severe, in early remission (H)  Stable.  No cravings.    3. 34 weeks gestation of pregnancy  Doing well, +FM, normal BP.  Noted she has not had routine prenatal labs.  Reached out to Dr Desirae Ramos who had initially seen patient for prenatal care earlier in pregnancy.  She is no longer providing prenatal care or delivering but recommended Dr Anastasiya Ramos in [place] for care and delivery.  She was able to secure an appointment with Dr Anastasiya Ramos for  @ 3pm @ the [facility] in [place].  She will also fax labs to [facility] for patient to complete while she is in [facility].  Plan for Tdap at ob visit on .         Return in about 12 days (around 2022) for Follow up, in person.    SUBJECTIVE                                                          CC/HPI:  Kalee Cedeno is a 31 year old female,  at 31 weeks, with PMH of methamphetamine use disorder, severe, in early remission, high-risk pregnancy in third trimester, hx of diet controlled gestational diabetes mellitus (GDM) in third trimester, Rh negative status, and opioid use  "disorder who presents to the [facility] for follow up.         Brief History:  Kalee Cedeno was first seen in [facility] on 10/06/22. She was referred by [address]  Patient's reasons for seeking treatment on this date include seeking a Suboxone prescription because she is out and currently pregnant. Previously living at her ex boyfriends house in [place], [place]. Taking 2-3 films (8-2 mg films) about three times per week, \"as needed\" for withdrawal symptoms or cravings. Last dose of Suboxone was almost 2 weeks ago. Last meth and opioid use was July 2022. Denies meth cravings. Denies IVDU. Periods of sustained sobriety during and after pregnancies.  in 2016, had her son in 2017, her now ex  was an alcoholic and they  on their second wedding anniversary. Later her brother passed away and she started methamphetamine daily. Heroin use started later, has been using for about 1 year. Started on Suboxone at the beginning of current pregnancy, found out in April of 2022. Started consistent daily dosing, 16 mg TDD.       Substance Use History :  Opioids:   Age at first use: 29 yo  Current use: timing of last use: 7/2022; substance: meth, heroin; quantity per pt not that much; route: smoke                IV drug use: No   History of overdose: No  Previous residential or outpatient treatments for addiction : Yes: 2  Previous medication treatments for addiction: Yes: suboxone  Longest period of sobriety: 1 year  Medical complications related to substance use: denies  Hepatitis C: neg; Antibody non reactive on 2/7/2022  HIV: neg; HIV 1/2 Ab/Ag non reactive on 2/7/2022      Other Addiction History:  Stimulants (cocaine, methamphetamine, MDMA, etc)   Past use: yes  Use in last 6 months: yes, was using methamphetamine daily.   Sedatives/hypnotics/anxiolytics: (benzodiazepines -Xanax, Valium, Ativan, Klonopin- GHB, Ambien, phenobarbital, etc)  Past use: denies  Use in last 6 months: no  Alcohol:   Past " use: yes  Use in last 6 months: no  Nicotine:   Cigarettes: yes  Vaping: yes, not recently  Chewing tobacco: no  Cannabis:   Past use: yes   Use in last 6 months: yes  Hallucinogens: (LSD/acid, mushrooms, ketamine, etc)  Past use: tried mushrooms  Use in last 6 months: no  Behavioral addictions (eating disorder, gambling, pornography, opal:)   denies      Most recent [facility] visit:  10/13/22    A/P last visit:  1. Opioid use disorder, severe, dependence (H)  Controlled.   Continue buprenorphine 16 mg per day, take 8 mg SL BID. Cravings controlled. No recent use. No adverse SE.   Continue inpatient programming at [facility]. Encouraged outpatient program near [place] after graduation and continuation of buprenorphine maintenance.   - Confirms access to narcan.   - SUBOXONE 8-2 MG per film; Place 1 Film under the tongue 2 times daily  Dispense: 30 Film; Refill: 0     2. Methamphetamine use disorder, severe, in early remission (H)  - Controlled. No recent use or cravings. Interventions as above for current and ongoing recovery supports.      3. Pregnancy with 31 completed weeks gestation  - Following with Dr. Rachel JACOBSEN.   - Ultrasound on 10/27/22 per patient     11/04/22, pt states she is doing well.    She is 34+4 wga and feeling well.  Baby boy (Ion) is active (+fetal movement) and she is eating and drinking well.  Had recent US and [facility] appointment on 10/27.  She is planning to return to [place] for delivery after finishing residential treatment at [facility] on 11/17.  Taking Suboxone 8-2mg BID as prescribed, no side effects, no opioid cravings.  No methamphetamine cravings.        [place] Prescription Drug Monitoring Program Reviewed:  Yes; as expected    Medications:  naloxone (NARCAN) 4 MG/0.1ML nasal spray, Spray 1 spray (4 mg) into one nostril alternating nostrils as needed for opioid reversal every 2-3 minutes until assistance arrives    No current facility-administered medications on file  prior to visit.      No Known Allergies    PMH, PSH, FamHx reviewed    Past Medical Hx (retrieved from Epic chart review)     Methamphetamine use disorder, severe, in early remission (HCC)     Stressful life event affecting family     High-risk pregnancy in second trimester     Substance abuse (HCC)     Diet controlled gestational diabetes mellitus (GDM) in third trimester     Rh negative status during pregnancy in first trimester, antepartum     Family history of hepatitis C      PAST PSYCHIATRIC HISTORY:  Diagnoses- manic depression, anxiety, adult ADHD (pt reported)   Suicide Attempts: No   Hospitalizations: No      Social History  Housing status: [facility]  Employment status: Unemployed, not seeking work  Relationship status: Single  Children: 2 children, 10 and 5yr old - not currently under her care. Both children are living with their fathers. Will regain visitation after completing 30 treatment program. No CPS involvement. Bland agreement between her and the fathers.   Legal: denies  Insurance needs: active  Contact information up to date? yes    OBJECTIVE                                                      /79   Pulse 78     Physical Exam  Vitals and nursing note reviewed.   Constitutional:       General: She is not in acute distress.     Appearance: Normal appearance. She is not ill-appearing or diaphoretic.   Eyes:      General: No scleral icterus.     Conjunctiva/sclera: Conjunctivae normal.   Cardiovascular:      Rate and Rhythm: Normal rate.   Pulmonary:      Effort: Pulmonary effort is normal. No respiratory distress.   Abdominal:      Comments: gravid   Skin:     General: Skin is warm and dry.   Neurological:      General: No focal deficit present.      Mental Status: She is alert and oriented to person, place, and time.      Gait: Gait normal.   Psychiatric:         Mood and Affect: Mood normal.         Behavior: Behavior normal.         Thought Content: Thought content normal.          Judgment: Judgment normal.         Labs:    UDS:   Point of care urine drug screen positive for:  Urine Drug Screen Results  AMP: Negative  BAR: Negative  BUP: Positive  BZO: Negative  FRANCI: Negative  mAMP: Negative  MDMA : Negative  MTD: Negative  ZJP246: Negative  OXY: Negative  PCP : Negative  THC : Negative  *POC urine drug screen does not screen for Fentanyl      No results found for this or any previous visit (from the past 240 hour(s)).      Patient counseling completed today:  Discussed mechanism of action, potential risks/benefits/side effects of medications and other recommendations above.  Recommend pt keep naloxone in their possession and reviewed other aspects of harm reduction to reduce risk of overdose with return to use.   Recommended avoiding concurrent use of alcohol, benzodiazepines or other sedatives with buprenorphine or other opioids.  Discussed importance of avoiding isolation, building a network of supportive relationships, avoiding people/places/things associated with past use to reduce risk of relapse; including motivational interviewing regarding psychosocial treatment for addiction.     Any Sanchez DO  [place] [address] [place] [id number] [phone]

## 2022-11-08 ENCOUNTER — TELEPHONE (OUTPATIENT)
Dept: BEHAVIORAL HEALTH | Facility: CLINIC | Age: 32
End: 2022-11-08

## 2022-11-08 NOTE — TELEPHONE ENCOUNTER
Please let Kalee know that I spoke with Dr Desirae Ramos on Friday 11/4 after our office visit.  She has faxed labs to us and those are now at the outpatient lab (next door to [facility]) so she should come do those as soon as she is able.      Also, Dr Desirae Ramos is no longer practicing in [place] and is no longer doing primary or prenatal care.  However, Dr Anastasiya Ramos is able to take Kalee on as a patient for prenatal care and delivery.  Kalee is scheduled to see Dr Anastasiya Ramos at [facility] on 11/18 @ 3pm.  Dr Desirae Ramos is happy to continue seeing Kalee for Suboxone and ongoing substance use care.    I will see Kalee as planned on 11/14, sooner if needed.    Thanks!    Any Sanchez, DO on 11/8/2022 at 9:21 AM

## 2022-11-08 NOTE — TELEPHONE ENCOUNTER
Writer attempted to reach out to patient per provider directive, no answer; left VM message asking for return call to [facility].     Alee Pantoja RN on 11/8/2022 at 2:45 PM

## 2022-11-09 NOTE — TELEPHONE ENCOUNTER
Second attempt to reach out to patient per provider directive, no answer; and unable to leave voicemail due to mailbox being full.    [facility]  [address], [address]   [place], [address]  Phone: [phone]  Fax: [phone]    Open Monday-Friday  9:00am-4:00pm  Closed over lunch hour  Walk in hours: 9am-11:30am and 12:30-3pm    Alee Pantoja RN on 11/9/2022 at 2:08 PM

## 2022-11-10 ENCOUNTER — MYC MEDICAL ADVICE (OUTPATIENT)
Dept: BEHAVIORAL HEALTH | Facility: CLINIC | Age: 32
End: 2022-11-10

## 2022-11-10 NOTE — TELEPHONE ENCOUNTER
Dimas message to patient regarding plan for prenatal care.    Any Sanchez, DO on 11/10/2022 at 10:29 AM

## 2022-12-06 ENCOUNTER — TELEPHONE (OUTPATIENT)
Dept: BEHAVIORAL HEALTH | Facility: CLINIC | Age: 32
End: 2022-12-06

## 2022-12-06 NOTE — TELEPHONE ENCOUNTER
Please reach out to patient.  She did not show for her last visit with us in November and she has not returned to care of her ob/pcp/addiction team to my knowledge.    Thanks.    Any Sanchez DO on 12/6/2022 at 8:42 AM

## 2022-12-06 NOTE — TELEPHONE ENCOUNTER
Attempted to call patient. No answer; LVM to call clinic and check MyChart (Mychart message sent by Dr. Sanchez last month not yet read).    Lavinia Kennedy RN on 12/6/2022 at 9:37 AM

## 2022-12-08 NOTE — TELEPHONE ENCOUNTER
Writer attempted to reach patient (2nd attempt) to follow-up per provider directive. No answer and unable to leave message due to ,ailbox being full.     [facility]  [address], [address]   [place], [address]  Phone: [phone]  Fax: [phone]    Open Monday-Friday  9:00am-4:00pm  Closed over lunch hour  Walk in hours: 9am-11:30am and 12:30-3pm    Alee Pantoja RN on 12/8/2022 at 1:16 PM

## 2023-02-12 ENCOUNTER — HEALTH MAINTENANCE LETTER (OUTPATIENT)
Age: 33
End: 2023-02-12

## 2023-02-24 ENCOUNTER — OFFICE VISIT (OUTPATIENT)
Dept: BEHAVIORAL HEALTH | Facility: CLINIC | Age: 33
End: 2023-02-24
Payer: COMMERCIAL

## 2023-02-24 VITALS — DIASTOLIC BLOOD PRESSURE: 86 MMHG | SYSTOLIC BLOOD PRESSURE: 128 MMHG | HEART RATE: 84 BPM | OXYGEN SATURATION: 97 %

## 2023-02-24 DIAGNOSIS — F11.20 OPIOID USE DISORDER, SEVERE, DEPENDENCE (H): Primary | ICD-10-CM

## 2023-02-24 DIAGNOSIS — F15.21 METHAMPHETAMINE USE DISORDER, SEVERE, IN EARLY REMISSION (H): ICD-10-CM

## 2023-02-24 DIAGNOSIS — F11.93 OPIOID WITHDRAWAL (H): ICD-10-CM

## 2023-02-24 LAB
FENTANYL UR QL: ABNORMAL
HCG UR QL: NEGATIVE

## 2023-02-24 PROCEDURE — 80307 DRUG TEST PRSMV CHEM ANLYZR: CPT | Performed by: FAMILY MEDICINE

## 2023-02-24 PROCEDURE — 99214 OFFICE O/P EST MOD 30 MIN: CPT | Performed by: FAMILY MEDICINE

## 2023-02-24 PROCEDURE — 81025 URINE PREGNANCY TEST: CPT | Performed by: FAMILY MEDICINE

## 2023-02-24 PROCEDURE — H0038 SELF-HELP/PEER SVC PER 15MIN: HCPCS

## 2023-02-24 RX ORDER — CLONIDINE HYDROCHLORIDE 0.1 MG/1
0.1 TABLET ORAL 3 TIMES DAILY PRN
Qty: 12 TABLET | Refills: 0 | Status: SHIPPED | OUTPATIENT
Start: 2023-02-24

## 2023-02-24 RX ORDER — TRAZODONE HYDROCHLORIDE 50 MG/1
25-50 TABLET, FILM COATED ORAL
Qty: 30 TABLET | Refills: 0 | Status: SHIPPED | OUTPATIENT
Start: 2023-02-24

## 2023-02-24 RX ORDER — ONDANSETRON 4 MG/1
4 TABLET, ORALLY DISINTEGRATING ORAL EVERY 8 HOURS PRN
Qty: 12 TABLET | Refills: 0 | Status: SHIPPED | OUTPATIENT
Start: 2023-02-24

## 2023-02-24 RX ORDER — GABAPENTIN 300 MG/1
300 CAPSULE ORAL 3 TIMES DAILY PRN
Qty: 12 CAPSULE | Refills: 0 | Status: SHIPPED | OUTPATIENT
Start: 2023-02-24

## 2023-02-24 RX ORDER — BUPRENORPHINE HYDROCHLORIDE, NALOXONE HYDROCHLORIDE 8; 2 MG/1; MG/1
1 FILM, SOLUBLE BUCCAL; SUBLINGUAL 2 TIMES DAILY
Qty: 14 FILM | Refills: 0 | Status: SHIPPED | OUTPATIENT
Start: 2023-02-24

## 2023-02-24 ASSESSMENT — PATIENT HEALTH QUESTIONNAIRE - PHQ9: SUM OF ALL RESPONSES TO PHQ QUESTIONS 1-9: 16

## 2023-02-24 ASSESSMENT — COLUMBIA-SUICIDE SEVERITY RATING SCALE - C-SSRS
2. HAVE YOU ACTUALLY HAD ANY THOUGHTS OF KILLING YOURSELF?: NO
6. HAVE YOU EVER DONE ANYTHING, STARTED TO DO ANYTHING, OR PREPARED TO DO ANYTHING TO END YOUR LIFE?: NO
TOTAL  NUMBER OF INTERRUPTED ATTEMPTS LIFETIME: NO
TOTAL  NUMBER OF ABORTED OR SELF INTERRUPTED ATTEMPTS LIFETIME: NO
1. HAVE YOU WISHED YOU WERE DEAD OR WISHED YOU COULD GO TO SLEEP AND NOT WAKE UP?: NO
ATTEMPT LIFETIME: NO

## 2023-02-24 NOTE — PATIENT INSTRUCTIONS
"When it has been AT LEAST 24 hours from your last use of other opioids:    1st: place 1/4 of one 8mg/2mg film under your tongue, then wait 30 minutes.    If withdrawal symptoms are not worse, take the remaining 3/4 of film (6mg.)    If withdrawal symptoms do increase after this \"test dose,\" don't take any more buprenorphine at that time, and use other medications for withdrawal symptoms.  Try start buprenorphine again the next day.     After the first 8mg dose on day one, you can take a second film at least one hour after the first.      Starting day 2, take one 8mg/2mg film every morning and another 8mg/2mg film every evening.  Continue this until your next appointment.     Schedule a follow up appointment in the [facility] within one week.   Let medical staff know of any problems in the meantime.     [facility]  [address] [place] [address], [address]   [place], [place], [address]  Phone: [phone]  Fax: [phone]    Open Monday-Friday  9:00am-4:00pm  Closed over lunch hour  Walk in hours: 9am-11:30am and 12:30-3pm              "

## 2023-02-24 NOTE — NURSING NOTE
"[facility]      Rooming information:  Approximate last use of full opioid agonist: 2/23/2023 at 10:00 pm   Taking buprenorphine? No. None in 2 weeks. Desirae Ramos was previously prescribing but patient has not seen in \"a while\".  Number of buprenorphine films/tablets remaining currently: 0  Side effects related to buprenorphine (constipation, dry mouth, sedation?) No   Narcan currently available: No  Other recent substance use:    Cannabis , Methamphetamine  and Fentanyl  NICOTINE-Yes: Vape and smoke  If using nicotine, ready to quit? No    Point of care urine drug screen positive for:  Urine Drug Screen Results  AMP: Negative  BAR: Negative  BUP: Negative  BZO: Negative  FRANCI: Negative  mAMP: Negative  MDMA : Negative  MTD: Negative  JJO140: Negative  OXY: Negative  PCP : Negative  THC : Positive  *POC urine drug screen does not screen for Fentanyl    Pregnancy Status   LMP: 2 weeks ago  Birth control/barriers: None  Interested in birth control if none currently? No  Urine pregnancy test specimen obtained and sent to lab:Yes    PHQ Assesment Total Score(s) 2/24/2023   PHQ-9 Score 16   Some recent data might be hidden       If PHQ-9 score of 15 or higher, has [facility] therapist or provider been notified? Yes    Any current suicidal ideation? Yes \"Nothing I would act upon\"  If yes, has [facility] therapist or provider been notified? Yes    Primary care provider: Physician No Ref-Primary     Mental health provider: None (follow up on MH referral if needed)    Insurance needs: Active    Housing needs: At [facility]     Contact information up to date? Yes    3rd Party Involvement:[facility] (please obtain SIMRAN if pt would like to include)    Sia Riggs RN  February 24, 2023  2:46 PM    "

## 2023-02-24 NOTE — PROGRESS NOTES
[facility] Recovery [facility] Follow Up    ASSESSMENT/PLAN                                                      1. Opioid use disorder, severe, dependence (H)  Return to use after last visit.  Would like to resume Suboxone.  Reviewed home induction instructions including timing of first dose, use of test dose, and risk of precipitated withdrawal. Titrate dose to previously effective dose (8-2mg BID).  New script for Narcan.  Continue treatment at [facility] as planned.    - HCG Qual, Urine ([id number]); Standing  - Fentanyl Urine, Qualitative; Future  - HCG Qual, Urine ([id number])  - Fentanyl Urine, Qualitative  - SUBOXONE 8-2 MG per film; Place 1 Film under the tongue 2 times daily  Dispense: 14 Film; Refill: 0  - naloxone (NARCAN) 4 MG/0.1ML nasal spray; Spray 1 spray (4 mg) into one nostril alternating nostrils as needed for opioid reversal every 2-3 minutes until assistance arrives  Dispense: 0.2 mL; Refill: 11    2. Opioid withdrawal (H)  Reviewed use of comfort medications.  - cloNIDine (CATAPRES) 0.1 MG tablet; Take 1 tablet (0.1 mg) by mouth 3 times daily as needed (opioid withdrawal)  Dispense: 12 tablet; Refill: 0  - ondansetron (ZOFRAN ODT) 4 MG ODT tab; Take 1 tablet (4 mg) by mouth every 8 hours as needed for nausea or vomiting  Dispense: 12 tablet; Refill: 0  - traZODone (DESYREL) 50 MG tablet; Take 0.5-1 tablets (25-50 mg) by mouth nightly as needed for sleep  Dispense: 30 tablet; Refill: 0  - gabapentin (NEURONTIN) 300 MG capsule; Take 1 capsule (300 mg) by mouth 3 times daily as needed (opioid withdrawal)  Dispense: 12 capsule; Refill: 0    3. Methamphetamine use disorder, severe, in early remission (H)  Last use 2 days ago.  Monitor and consider pharmacotherapy at future visit if needed.  Plan for treatment at [facility].       Return in about 1 week (around 3/3/2023) for Follow up, with me.    SUBJECTIVE                                                          CC/HPI:  Kalee Jorgensenehn is a 31 year old  "female,  at 31 weeks, with PMH of methamphetamine use disorder, severe, in early remission, high-risk pregnancy in third trimester, hx of diet controlled gestational diabetes mellitus (GDM) in third trimester, Rh negative status, and opioid use disorder who presents to the Recovery Clinic for follow up.         Brief History:  Kalee Cedeno was first seen in Recovery Clinic on 10/06/22. She was referred by [address]  Patient's reasons for seeking treatment on this date include seeking a Suboxone prescription because she is out and currently pregnant. Previously living at her ex boyfriends house in [place], [place]. Taking 2-3 films (8-2 mg films) about three times per week, \"as needed\" for withdrawal symptoms or cravings. Last dose of Suboxone was almost 2 weeks ago. Last meth and opioid use was 2022. Denies meth cravings. Denies IVDU. Periods of sustained sobriety during and after pregnancies.  in , had her son in , her now ex  was an alcoholic and they  on their second wedding anniversary. Later her brother passed away and she started methamphetamine daily. Heroin use started later, has been using for about 1 year. Started on Suboxone at the beginning of current pregnancy, found out in 2022. Started consistent daily dosing, 16 mg TDD.       Substance Use History :  Opioids:   Age at first use: 29 yo  Current use: timing of last use: 23; substance:, heroin; quantity per pt 1g/day; route: smoke                IV drug use: No   History of overdose: No  Previous residential or outpatient treatments for addiction : Yes: 3  Previous medication treatments for addiction: Yes: suboxone  Longest period of sobriety: 1 year  Medical complications related to substance use: denies  Hepatitis C: neg; Antibody non reactive on 2022  HIV: neg; HIV 1/2 Ab/Ag non reactive on 2022      Other Addiction History:  Stimulants (cocaine, methamphetamine, MDMA, etc)   Past " use: yes  Use in last 6 months: yes, was using methamphetamine daily.   Sedatives/hypnotics/anxiolytics: (benzodiazepines -Xanax, Valium, Ativan, Klonopin- GHB, Ambien, phenobarbital, etc)  Past use: denies  Use in last 6 months: no  Alcohol:   Past use: yes  Use in last 6 months: no  Nicotine:   Cigarettes: yes  Vaping: yes, not recently  Chewing tobacco: no  Cannabis:   Past use: yes              Use in last 6 months: yes  Hallucinogens: (LSD/acid, mushrooms, ketamine, etc)  Past use: tried mushrooms  Use in last 6 months: no  Behavioral addictions (eating disorder, gambling, pornography, opal:)   denies     PAST PSYCHIATRIC HISTORY:  Diagnoses- manic depression, anxiety, adult ADHD (pt reported)   Suicide Attempts: No   Hospitalizations: No      Social History  Housing status: [facility]  Employment status: Unemployed, not seeking work  Relationship status: Single  Children: 3 children, 10 and 5yr old, 3 month old - not currently under her care. Both children are living with their fathers. Will regain visitation after completing 30 treatment program. No CPS involvement. Cameron agreement between her and the fathers.   Legal: CPS w/ youngest  Insurance needs: active  Contact information up to date? yes    Most recent Recovery Clinic visit: 11/4/22    A/P last visit:  1. Opioid use disorder, severe, dependence (H)  Stable.  Continue Suboxone 8-2mg BID.  Continue treatment at [facility].  Planning to resume care in [facility] after graduation.  I spoke with Dr Desirae Ramos @ [facility] and she knows patient and will be able to resume care for her upon discharge ([facility] [address] [place] [address] [address], phone # [phone]).  Discussed plan to provide 30 days of medications at next visit to allow her time to re-establish with Dr Ramos.  There are outpatient treatment options with [facility] as well.    - SUBOXONE 8-2 MG per film; Place 1 Film  under the tongue 2 times daily  Dispense: 26 Film; Refill: 0     2. Methamphetamine use disorder, severe, in early remission (H)  Stable.  No cravings.     3. 34 weeks gestation of pregnancy  Doing well, +FM, normal BP.  Noted she has not had routine prenatal labs.  Reached out to Dr Desirae Ramos who had initially seen patient for prenatal care earlier in pregnancy.  She is no longer providing prenatal care or delivering but recommended Dr Anastasiya Ramos in [place] for care and delivery.  She was able to secure an appointment with Dr Anastasiya Ramos for 11/18 @ 3pm @ the [facility] in [place].  She will also fax labs to Recovery Clinic for patient to complete while she is in [facility].  Plan for Tdap at ob visit on 11/18.      02/24/23 visit:  Arrived at [facility] today, starting 12 week program  Last dose of Suboxone was about 2 weeks ago  Plans to follow-up here while at [facility]  Ion born 12/11, doing great, super happy and healthy!  Visits with her son, current in foster care (dad is in sober living)  Other kids with their dads  Last use was last night 10pm - using about 1g/day, smoking  Last use of meth a few days ago  Cannabis today  Feeling depressed recently, better now that she is in treatment    [place] Prescription Drug Monitoring Program Reviewed:  Yes; as expected    Medications:  No current outpatient medications on file prior to visit.  No current facility-administered medications on file prior to visit.      No Known Allergies    PMH, PSH, FamHx reviewed      OBJECTIVE                                                      /86   Pulse 84   SpO2 97%   PHQ 10/13/2022 11/4/2022 2/24/2023   PHQ-9 Total Score 6 3 16   Q9: Thoughts of better off dead/self-harm past 2 weeks Not at all Not at all Several days         Physical Exam  Vitals and nursing note reviewed.   Constitutional:       General: She is not in acute distress.     Appearance: Normal appearance. She is not ill-appearing  or diaphoretic.   HENT:      Head: Normocephalic and atraumatic.   Eyes:      General: No scleral icterus.     Conjunctiva/sclera: Conjunctivae normal.   Cardiovascular:      Rate and Rhythm: Normal rate.   Pulmonary:      Effort: Pulmonary effort is normal.   Skin:     General: Skin is warm and dry.      Coloration: Skin is not jaundiced or pale.   Neurological:      General: No focal deficit present.      Mental Status: She is alert and oriented to person, place, and time.      Gait: Gait normal.   Psychiatric:         Attention and Perception: Attention normal.         Mood and Affect: Mood is anxious and depressed. Affect is not inappropriate.         Speech: Speech normal.         Behavior: Behavior normal. Behavior is cooperative.         Thought Content: Thought content does not include suicidal ideation.         Judgment: Judgment normal.         Labs:    UDS:  02/24/23  Urine Drug Screen Results  AMP: Negative  BAR: Negative  BUP: Negative  BZO: Negative  FRANCI: Negative  mAMP: Negative  MDMA : Negative  MTD: Negative  [id number]: Negative  OXY: Negative  PCP : Negative  THC : Positive  *POC urine drug screen does not screen for Fentanyl      Recent Results (from the past 240 hour(s))   HCG Qual, Urine ([id number])    Collection Time: 02/24/23  3:47 PM   Result Value Ref Range    hCG Urine Qualitative Negative Negative   Fentanyl Urine, Qualitative    Collection Time: 02/24/23  3:47 PM   Result Value Ref Range    Fentanyl Qual Urine Screen Positive (A) Screen Negative         Patient counseling completed today:  Discussed mechanism of action, potential risks/benefits/side effects of medications and other recommendations above.  Recommend pt keep naloxone in their possession and reviewed other aspects of harm reduction to reduce risk of overdose with return to use.   Recommended avoiding concurrent use of alcohol, benzodiazepines or other sedatives with buprenorphine or other opioids.  Discussed importance of  avoiding isolation, building a network of supportive relationships, avoiding people/places/things associated with past use to reduce risk of relapse; including motivational interviewing regarding psychosocial treatment for addiction.       DO MICHELE Oliver [facility]  [address] [place] [id number] [phone]

## 2023-02-24 NOTE — PROGRESS NOTES
[facility]    Peer  met with Kalee Cedeno in the Recovery Clinic to introduce himself, detail services provided and discuss current status of recovery. Pt appeared alert, oriented and open to feedback during our discussion.     Pt arrives for visit with provider for suboxone refill.   Pt reports relapsed and currently at in patient treatment Center at [facility]. She shared  has been struggling her addiction  for a couple of years. This writer praised her  coming to the clinic today and enrolling back in treatment center. During our walk to the the pharmacy PRC encouraged establishing firm and healthy boundaries with ppl, places and things as an important element to the recovery process.    PRC provided business card to pt welcoming contact for recovery based support and resources. PRC and pt agree to speak again during an upcoming RC visit.           Service Type:     Individual     Session Start Time:  3:00 pm                   Session End Time:    3:15 pm    Session Length:  15         Patient Goal:   To utilize suboxone assisted treatment for sobriety and long term recovery.     Goal Progress:   Ongoing.    Key Risk Factors to Recovery:   PRC encourages being aware of risk factors that can lead to re-use which include avoiding isolation, avoiding triggers and managing cravings in a healthy manner. being open and willing to acceptance and change on a daily basis.  PRC encourages pt to establish a sober network calling tree to reach out to when needed.  Continue to practice honesty with ourselves and trusted support person(s).   PRC encourages regular attendance at recovery based meetings as well as finding a sponsor for mentoring and accountability.   PRC encourages consideration of other services such as counseling for mental health issues which can correlate with our substance use.      Support Needs:   Ongoing care, support and resources for opioid substance use disorder.      Follow up:   PRC has provided pt with his contact information for support and resource needs.    PRC and pt agree to meet during an upcoming  visit.       [facility]  [address], [address]   [place], [place], [address]  Clinic Phone: [phone]  Clinic Fax: [phone]  Peer  phone: [phone]    Open Monday - Friday  9:00am-4:00pm  Walk in hours: 9am-3pm      Cookie Price  February 24, 2023  4:09 PM    [facility] provides clinical oversite and supervision of care.

## 2023-02-24 NOTE — NURSING NOTE
RN was notified by rooming staff that the patient had an elevated PHQ-9 score and answered greater than 0 on question 9 indicating thoughts that they would be better off dead, or hurting themself in some way. RN met with patient to complete the C-SSRS.    Patient feels she is letting people down and she is not giving herself at her full potential. That she should do better but she cannot.     Patient endorses denies current or recent suicidal ideation or behavior    [place]-Suicide Severity Rating Scale (C-SSRS) score: no risk    RN updated the provider with C-SSRS risk level.     Current stressors include: Relapse and not having kids. Not having her own place or a full time job. Not knowing how she would manage a full time job with all the things she has to do.    Patient identified the following protective factors: Has lost people to suicide before and does not want other people to feel that way. Feels her kids need her.     Patient was given the number for the National Suicide Prevention Line (988) along with a list of crisis resources and numbers.      Sia Riggs RN on 2/24/2023 at 2:56 PM

## 2024-03-10 ENCOUNTER — HEALTH MAINTENANCE LETTER (OUTPATIENT)
Age: 34
End: 2024-03-10

## 2025-03-16 ENCOUNTER — HEALTH MAINTENANCE LETTER (OUTPATIENT)
Age: 35
End: 2025-03-16